# Patient Record
Sex: MALE | Race: WHITE | NOT HISPANIC OR LATINO
[De-identification: names, ages, dates, MRNs, and addresses within clinical notes are randomized per-mention and may not be internally consistent; named-entity substitution may affect disease eponyms.]

---

## 2018-07-17 ENCOUNTER — TRANSCRIPTION ENCOUNTER (OUTPATIENT)
Age: 28
End: 2018-07-17

## 2019-09-11 ENCOUNTER — EMERGENCY (EMERGENCY)
Facility: HOSPITAL | Age: 29
LOS: 1 days | Discharge: HOME | End: 2019-09-11
Admitting: EMERGENCY MEDICINE
Payer: COMMERCIAL

## 2019-09-11 VITALS
TEMPERATURE: 97 F | HEART RATE: 75 BPM | SYSTOLIC BLOOD PRESSURE: 149 MMHG | RESPIRATION RATE: 18 BRPM | DIASTOLIC BLOOD PRESSURE: 83 MMHG | OXYGEN SATURATION: 98 %

## 2019-09-11 PROCEDURE — 12001 RPR S/N/AX/GEN/TRNK 2.5CM/<: CPT | Mod: RT

## 2019-09-11 PROCEDURE — 99283 EMERGENCY DEPT VISIT LOW MDM: CPT | Mod: 25

## 2019-09-11 NOTE — ED ADULT TRIAGE NOTE - CHIEF COMPLAINT QUOTE
Pt states, "I broke the shower knob that was ceramic and puncture the palm on my right hand." Deep laceration noted to right palm, approx 1' 1/2 in length. Bleeding controlled at this time. Pt able to move digits, positive radial pulse. Denies taking blood thinners. Dressing applied. Unknown with tetanus.

## 2019-09-12 PROCEDURE — 73130 X-RAY EXAM OF HAND: CPT | Mod: 26,RT

## 2019-09-12 RX ORDER — IBUPROFEN 200 MG
800 TABLET ORAL ONCE
Refills: 0 | Status: COMPLETED | OUTPATIENT
Start: 2019-09-12 | End: 2019-09-12

## 2019-09-12 RX ORDER — TETANUS TOXOID, REDUCED DIPHTHERIA TOXOID AND ACELLULAR PERTUSSIS VACCINE, ADSORBED 5; 2.5; 8; 8; 2.5 [IU]/.5ML; [IU]/.5ML; UG/.5ML; UG/.5ML; UG/.5ML
0.5 SUSPENSION INTRAMUSCULAR ONCE
Refills: 0 | Status: COMPLETED | OUTPATIENT
Start: 2019-09-12 | End: 2019-09-12

## 2019-09-12 RX ADMIN — TETANUS TOXOID, REDUCED DIPHTHERIA TOXOID AND ACELLULAR PERTUSSIS VACCINE, ADSORBED 0.5 MILLILITER(S): 5; 2.5; 8; 8; 2.5 SUSPENSION INTRAMUSCULAR at 01:31

## 2019-09-12 RX ADMIN — Medication 100 MILLIGRAM(S): at 01:31

## 2019-09-12 RX ADMIN — Medication 800 MILLIGRAM(S): at 01:30

## 2019-09-12 NOTE — ED PROVIDER NOTE - SKIN, MLM
Skin normal color for race, warm, dry.  Right hand mid-thenar area with obliquely-oriented laceration approx 2 cm long, with no active bleeding on exam.  No induration of tissue; no visible debris/FB to wound margins.  FAROM of digits right hand with intact 5/5 strength in all directions; +NVI.

## 2019-09-12 NOTE — ED PROCEDURE NOTE - CPROC ED LACERATION CLEANSED1
cleansed/copious irrigation/ mL under 18 G pressure, with wound margins explored; wound was deep ~1 cm into thenar soft tissue; no visible tendon/bone/FB.

## 2019-09-12 NOTE — ED ADULT NURSE NOTE - OBJECTIVE STATEMENT
pt received at intake rm 10c AAO x 3. pt reports right hand laceration when showering, pt went to turn off ceramic shower knob when it broke, lacerating his right hand. pt denies sob, chest pain, n/v, fevers, chills at this time. pt not actively bleeding at this time.

## 2019-09-12 NOTE — ED PROVIDER NOTE - NSFOLLOWUPINSTRUCTIONS_ED_ALL_ED_FT
Return to the Emergency Department for wound check in 2 days.  Return to the Emergency Department for suture removal in 10 - 12 days.  Notify your primary care provider of your ER visit.  You received Adacel Tdap vaccination during your ER visit; notify your primary care doctor.  If you need to find a doctor to follow up with, you may call the [facility] Patient Access Services helpline at [phone] to find names/contact #s for a practitioner (or specialist) to follow up with.  Bring your discharge papers / test results with you to your follow up appointment(s).  Rest / no strenuous activity.  Stay well hydrated.  Wound care:  Leave dressing on wound x 24 hours; keep clean and dry.  After 24 hours, please start twice daily wound care:  Clean wound gently with mild soap and water.  If there is any dried/scabbed blood, you may use DILUTED hydrogen peroxide (50% hydrogen peroxide + 50% water) and Q-tips to clean between/around the stitches.  Rinse wound with water, and apply GENTLE pressure to dry the wound (do not rub wound).  Let air dry for 5 minutes, then apply THIN layer of bacitracin antibiotic ointment (you can obtain more over-the-counter at any pharmacy).  Cover with sterile bandage.  Monitor wound for signs of infection (redness, increased swelling, increasing pain, cloudy drainage); return to the ER immediately if any concerns.  MEDICATIONS:  See attached medication sheet(s).  ***Return to the Emergency Department if you experience any new / worsening symptoms or have any problems / concerns.*** Return to the Emergency Department for wound check in 2 days.  Return to the Emergency Department for suture removal in 10 - 12 days.  Notify your primary care provider of your ER visit.  You received Adacel Tdap vaccination during your ER visit; notify your primary care doctor.  If you need to find a doctor to follow up with, you may call the [facility] Patient Access Services helpline at [phone] to find names/contact #s for a practitioner (or specialist) to follow up with.  Bring your discharge papers / test results with you to your follow up appointment(s).  Rest / no strenuous activity.  Stay well hydrated.  Wound care:  Leave dressing on wound x 24 hours; keep clean and dry.  After 24 hours, remove dressing (if dressing sticks to wound, rinse under cool water until it falls off); then please start twice daily wound care:    Clean wound gently with mild soap and water.  If there is any dried/scabbed blood, you may use DILUTED hydrogen peroxide (50% hydrogen peroxide + 50% water) and Q-tips to clean between/around the stitches.    Rinse wound with water, and apply GENTLE pressure to dry the wound (do not rub wound).    Let air dry for 5 minutes, then apply THIN layer of bacitracin antibiotic ointment (you can obtain more over-the-counter at any pharmacy).  Cover with sterile bandage.  Monitor wound for signs of infection (redness, increased swelling, increasing pain, cloudy drainage); return to the ER immediately if any concerns.  MEDICATIONS:  See attached medication sheet(s). You may take ibuprofen 600 mg dose every 6 hours as needed for discomfort/inflammation; take with food.  To follow up on official radiology report (right hand x-ray), you can call the [phone] and ask to speak to the Administrative PA (call between 9am - 2pm).  ***Return to the Emergency Department if you experience any new / worsening symptoms or have any problems / concerns.***

## 2019-09-12 NOTE — ED PROVIDER NOTE - PATIENT PORTAL LINK FT
You can access the FollowMyHealth Patient Portal offered by [facility] by registering at the following website: [email or web address]. By joining [employer]’s FollowMyHealth portal, you will also be able to view your health information using other applications (apps) compatible with our system.

## 2019-09-12 NOTE — ED PROCEDURE NOTE - CPROC ED POST PROC CARE GUIDE1
Instructed patient/caregiver regarding signs and symptoms of infection./Verbal/written post procedure instructions were given to patient/caregiver./See patient d/c instructions for care instructions given.  Patient advised he could follow up for wound check and suture removal with either the ED, Urgent Care center, or Hand specialist (referral list and referral # for [facility] Patient Access dept given to pt).  Pt verbalized understanding to all./Keep the cast/splint/dressing clean and dry.

## 2019-09-12 NOTE — ED PROVIDER NOTE - OBJECTIVE STATEMENT
30 yo male, no stated PMH, presented to the ED for right hand (thenar area) laceration, sustained as he was in the shower, went to turn ceramic knob, states ceramic knob broke and pt is unsure if he cut his hand on metal or ceramic pieces of knob.  Pt denies foreign body sensation; c/o soreness to thenar area around site of laceration; unsure of year of last tetanus; states ?6 years ago?.  No other c/o or injuries.  Pt. is right hand dominant.

## 2019-09-12 NOTE — ED PROCEDURE NOTE - PROCEDURE ADDITIONAL DETAILS
Post-procedure, Bacitracin, Telfa, gentle pressure dressing applied to wound area, with overlying gauze roll.  Pt. given wound care supplies and instructed on care per my usual process.

## 2019-09-12 NOTE — ED PROCEDURE NOTE - CPROC ED INJURY COMPLICATION1
Prominent thenar eminences bilateral hands; Laceration ~1 cm deep into thenar soft tissue; no pulsatile bleed noted on wound exploration.

## 2019-09-12 NOTE — ED PROVIDER NOTE - CLINICAL SUMMARY MEDICAL DECISION MAKING FREE TEXT BOX
30 yo male, right hand laceration, unclear last Td.  Plan:  Tdap, ibuprofen, x-ray right hand, wound exploration / suture repair per usual process.

## 2019-09-21 PROBLEM — Z78.9 OTHER SPECIFIED HEALTH STATUS: Chronic | Status: ACTIVE | Noted: 2019-09-12

## 2019-09-23 PROBLEM — Z00.00 ENCOUNTER FOR PREVENTIVE HEALTH EXAMINATION: Status: ACTIVE | Noted: 2019-09-23

## 2019-09-26 ENCOUNTER — APPOINTMENT (OUTPATIENT)
Dept: ORTHOPEDIC SURGERY | Facility: CLINIC | Age: 29
End: 2019-09-26
Payer: COMMERCIAL

## 2019-09-26 VITALS — WEIGHT: 235 LBS | HEIGHT: 71 IN | BODY MASS INDEX: 32.9 KG/M2

## 2019-09-26 DIAGNOSIS — Z87.891 PERSONAL HISTORY OF NICOTINE DEPENDENCE: ICD-10-CM

## 2019-09-26 DIAGNOSIS — Z78.9 OTHER SPECIFIED HEALTH STATUS: ICD-10-CM

## 2019-09-26 DIAGNOSIS — S61.411A LACERATION W/OUT FOREIGN BODY OF RIGHT HAND, INITIAL ENCOUNTER: ICD-10-CM

## 2019-09-26 DIAGNOSIS — Z80.9 FAMILY HISTORY OF MALIGNANT NEOPLASM, UNSPECIFIED: ICD-10-CM

## 2019-09-26 DIAGNOSIS — Z86.79 PERSONAL HISTORY OF OTHER DISEASES OF THE CIRCULATORY SYSTEM: ICD-10-CM

## 2019-09-26 PROCEDURE — 99203 OFFICE O/P NEW LOW 30 MIN: CPT

## 2019-09-26 RX ORDER — LORAZEPAM 2 MG/1
TABLET ORAL
Refills: 0 | Status: ACTIVE | COMMUNITY

## 2019-09-26 RX ORDER — ALPRAZOLAM 0.25 MG/1
0.25 TABLET ORAL
Refills: 0 | Status: ACTIVE | COMMUNITY

## 2019-09-26 RX ORDER — LORATADINE 10 MG/1
10 TABLET ORAL
Refills: 0 | Status: ACTIVE | COMMUNITY

## 2019-09-26 NOTE — DISCUSSION/SUMMARY
[de-identified] : The underlying pathophysiology was reviewed with the patient. Treatment options were discussed including; observation, NSAIDS. \par \par Sutures were removed by TERA Flores. Steri strips were applied. Keep the wound dry until the weekend. \par Massage the scar with vitamin E oil when strips have fallen off. \par Follow up in 6 weeks as needed.

## 2019-09-26 NOTE — HISTORY OF PRESENT ILLNESS
[de-identified] : Pt is a 30 y/o RHD male with right hand wound.  He states that he punctured the palm of his hand with a metal valve of his shower knob when the ceramic knob shattered about 2 weeks ago.  He went to [facility] where sutures were placed.  Xrays were taken which were negative for fracture or foreign body.  He presents for wound check.  It is tender to the touch. He denies fever, chills.

## 2019-09-26 NOTE — PHYSICAL EXAM
[de-identified] : Patient is WDWN, alert, and in no acute distress. Breathing is unlabored. He is grossly oriented to person, place, and time. \par \par Right hand: Nylon sutures in  the thenar eminence. There are no signs of infections. There is minimal tenderness to palpation. There is full arc of motion in the fingers. All intrinsic and extrinsic hand muscles 5/5. Active motion in the thumb. No joint instability on provocative testing. Sensation is intact to light touch.

## 2019-09-26 NOTE — ADDENDUM
[FreeTextEntry1] : I, Noemi Lopez wrote this note acting as a scribe for Dr. Brian Patel on Sep 26, 2019.

## 2019-09-26 NOTE — END OF VISIT
[FreeTextEntry3] : I, Brian Patel MD, ordering physician, have read and attest that all the information, medical decision making and discharge instructions within are true and accurate.

## 2024-07-15 ENCOUNTER — NON-APPOINTMENT (OUTPATIENT)
Age: 34
End: 2024-07-15